# Patient Record
Sex: MALE | Race: WHITE
[De-identification: names, ages, dates, MRNs, and addresses within clinical notes are randomized per-mention and may not be internally consistent; named-entity substitution may affect disease eponyms.]

---

## 2024-02-08 ENCOUNTER — HOSPITAL ENCOUNTER (OUTPATIENT)
Dept: HOSPITAL 99 - MRI 3T | Age: 49
End: 2024-02-08
Payer: COMMERCIAL

## 2024-02-08 DIAGNOSIS — M51.26: ICD-10-CM

## 2024-02-08 DIAGNOSIS — M54.16: Primary | ICD-10-CM

## 2024-08-15 ENCOUNTER — OFFICE VISIT (OUTPATIENT)
Dept: GASTROENTEROLOGY | Facility: CLINIC | Age: 49
End: 2024-08-15
Payer: COMMERCIAL

## 2024-08-15 ENCOUNTER — TELEPHONE (OUTPATIENT)
Dept: GASTROENTEROLOGY | Facility: CLINIC | Age: 49
End: 2024-08-15

## 2024-08-15 ENCOUNTER — TELEPHONE (OUTPATIENT)
Age: 49
End: 2024-08-15

## 2024-08-15 VITALS
HEIGHT: 71 IN | BODY MASS INDEX: 23.38 KG/M2 | DIASTOLIC BLOOD PRESSURE: 90 MMHG | WEIGHT: 167 LBS | SYSTOLIC BLOOD PRESSURE: 120 MMHG

## 2024-08-15 DIAGNOSIS — K59.09 OTHER CONSTIPATION: ICD-10-CM

## 2024-08-15 DIAGNOSIS — R19.8 CHANGE IN BOWEL FUNCTION: Primary | ICD-10-CM

## 2024-08-15 DIAGNOSIS — R63.4 WEIGHT LOSS, ABNORMAL: ICD-10-CM

## 2024-08-15 PROCEDURE — 99204 OFFICE O/P NEW MOD 45 MIN: CPT | Performed by: STUDENT IN AN ORGANIZED HEALTH CARE EDUCATION/TRAINING PROGRAM

## 2024-08-15 RX ORDER — CALCIUM CARBONATE 500 MG/1
1 TABLET, CHEWABLE ORAL DAILY
COMMUNITY

## 2024-08-15 RX ORDER — SITAGLIPTIN AND METFORMIN HYDROCHLORIDE 50; 500 MG/1; MG/1
TABLET, FILM COATED, EXTENDED RELEASE ORAL
COMMUNITY

## 2024-08-15 RX ORDER — OMEPRAZOLE 10 MG/1
CAPSULE, DELAYED RELEASE ORAL
COMMUNITY

## 2024-08-15 RX ORDER — HYDROCODONE BITARTRATE AND ACETAMINOPHEN 5; 325 MG/1; MG/1
TABLET ORAL
COMMUNITY

## 2024-08-15 RX ORDER — LIDOCAINE 50 MG/G
1 PATCH TOPICAL DAILY
COMMUNITY
Start: 2024-04-02

## 2024-08-15 RX ORDER — GABAPENTIN 100 MG/1
1 CAPSULE ORAL 3 TIMES DAILY
COMMUNITY
Start: 2024-04-02

## 2024-08-15 RX ORDER — VALACYCLOVIR HYDROCHLORIDE 1 G/1
TABLET, FILM COATED ORAL
COMMUNITY

## 2024-08-15 RX ORDER — SILDENAFIL 100 MG/1
TABLET, FILM COATED ORAL
COMMUNITY

## 2024-08-15 RX ORDER — IBUPROFEN 400 MG/1
TABLET, FILM COATED ORAL
COMMUNITY

## 2024-08-15 RX ORDER — GABAPENTIN 300 MG/1
CAPSULE ORAL
COMMUNITY
Start: 2024-08-09

## 2024-08-15 RX ORDER — POLYETHYLENE GLYCOL 3350, SODIUM CHLORIDE, SODIUM BICARBONATE, POTASSIUM CHLORIDE 420; 11.2; 5.72; 1.48 G/4L; G/4L; G/4L; G/4L
4000 POWDER, FOR SOLUTION ORAL ONCE
Qty: 4000 ML | Refills: 0 | Status: SHIPPED | OUTPATIENT
Start: 2024-08-15 | End: 2024-08-15

## 2024-08-15 RX ORDER — PREDNISONE 10 MG/1
TABLET ORAL
COMMUNITY
Start: 2024-03-07

## 2024-08-15 NOTE — PROGRESS NOTES
Consultation - [facility]     Billy Zee 48 y.o. male MRN: [id number]  Unit/Bed#:  Encounter: [id number]    Consults    ASSESSMENT and PLAN    1. Change in bowel function  Significant constipation with bloating, abdominal discomfort, respiratory discomfort and 'foul gas'. Not responding to over the counter medications. Plan to start Linzess 290 mcg for minimum of 8 weeks. Plan for EGD/colonoscopy to rule out any structural/mucosal disease, minimum 4 weeks from now to allow Linzess to work and increase likelihood of successful bowel prep. Golytely sent. Procedure, preparation, risk and benefits discussed in detail.  - linaCLOtide 290 MCG CAPS; Take 1 capsule by mouth in the morning  Dispense: 30 capsule; Refill: 1  - EGD; Future  - Colonoscopy; Future  - polyethylene glycol-electrolytes (NULYTELY) 4000 mL solution; Take 4,000 mL by mouth once for 1 dose  Dispense: 4000 mL; Refill: 0    2. Weight loss, abnormal  I suspect his weight loss is due to his constipation which is leading to his significant abdominal complaints. Will begin Linzess as previously discussed and get EGD/colonoscopy.  - EGD; Future  - Colonoscopy; Future  - polyethylene glycol-electrolytes (NULYTELY) 4000 mL solution; Take 4,000 mL by mouth once for 1 dose  Dispense: 4000 mL; Refill: 0    3. Other constipation  Linzess as above  - linaCLOtide 290 MCG CAPS; Take 1 capsule by mouth in the morning  Dispense: 30 capsule; Refill: 1      Chief Complaint   Patient presents with    Constipation       HPI  Billy Zee is a 48 y.o. year old male with a past medical history of chronic altered bowel habits, chronic intermittent RUQ discomfort who presents for evaluation of significant abdominal discomfort, constipation, bloating and changes in stool caliber.    Beginning in February of last year he developed significant GI issues including vomiting, RUQ 'sticking out'. He also complained of vomiting and went to the ED for 'food  "poisoning'.     He is complaining of constipation \"like I've never had before\" with significant abdominal bloating that is leading to respiratory discomfort. He also reports that he is dropping weight and not eating well. He has taken dulcolax, MiraLAX and fiber without any relief. He is concerned about a blockage somewhere.    He also states that when he does have solid stool, it can be thin. He has never had an upper endoscopy, colonoscopy or CT to his knowledge. He did have hernia surgery in the past.      GI History:  Prior Colonoscopy: No prior colonoscopy  Prior EGD:  No prior EGD  Family hx:  No reported first degree relatives with colorectal cancer  Surgical hx: hernia repair  Blood thinners: Denies antiplatelet or anticoagulation use  NSAID use: Denies regular NSAID use  DM meds: None  Social Hx: No regular ETOH, smoking, or drug use.          Historical Information   No past medical history on file.  No past surgical history on file.  Social History   Social History     Substance and Sexual Activity   Alcohol Use Not on file     Social History     Substance and Sexual Activity   Drug Use Not on file     Social History     Tobacco Use   Smoking Status Not on file   Smokeless Tobacco Not on file     No family history on file.    Meds/Allergies     No current facility-administered medications for this visit.     Not in a hospital admission.    No Known Allergies    PHYSICAL EXAM    Visit Vitals  /90 (BP Location: Right arm, Patient Position: Sitting)   Ht 5' 11\" (1.803 m)   Wt 75.8 kg (167 lb)   BMI 23.29 kg/m²   BSA 1.95 m²     Body mass index is 23.29 kg/m².  General Appearance: NAD, cooperative, alert  Eyes: Anicteric, EOMI  ENT: Normocephalic, atraumatic, normal mucosa  Neck: symmetrical, trachea midline  Lungs: clear to auscultation, non-labored respirations on room air, no wheezing  CV: S1 S2+ radial pulses bilaterally  GI:  Soft, non-tender, non-distended; normal bowel sounds; no masses, no " "organomegaly   Rectal: Deferred  Musculoskeletal: No gross deformities or pitting edema  Skin:  No jaundice, no rashes  Neurologic: awake, alert, oriented, no gross deficits    No results found for: \"GLUCOSE\", \"CALCIUM\", \"NA\", \"K\", \"CO2\", \"CL\", \"BUN\", \"CREATININE\"  No results found for: \"WBC\", \"HGB\", \"MCV\", \"PLT\"  No results found for: \"ALT\", \"AST\", \"GGT\", \"ALKPHOS\", \"TBILI\"  No results found for: \"AMYLASE\"  No results found for: \"LIPASE\"  No results found for: \"IRON\", \"TIBC\", \"FERRITIN\"  No results found for: \"INR\"    No results found.    Imaging Studies: I have personally reviewed pertinent reports.      Pathology, and Other Studies: I have personally reviewed pertinent reports.      REVIEW OF SYSTEMS    CONSTITUTIONAL: negative unless stated in HPI  GASTROINTESTINAL: As noted in the HPI        "

## 2024-08-15 NOTE — TELEPHONE ENCOUNTER
PA for Linzess 290 SUBMITTED     via    [][facility]-KEY:   [x]Martha-Case ID #   [id number]    Payer: [employer]   Phone: [phone]     []Faxed to plan   []Other website   []Phone call Case ID #     Office notes sent, clinical questions answered. Awaiting determination    Turnaround time for your insurance to make a decision on your Prior Authorization can take 7-21 business days.

## 2024-08-15 NOTE — TELEPHONE ENCOUNTER
Scheduled date of combo (as of today):09/27/24  Physician performing combo:Dr Grajeda  Location of combo: [facility]  Bowel prep reviewed with patient: golytely  Instructions reviewed with patient by:geo  Clearances: none

## 2024-08-15 NOTE — TELEPHONE ENCOUNTER
PA for Linzess 290 Denied    Reason:(Screenshot if applicable)  Denied    Note from payer: pwe were not able to establish medical necessity based on the information submitted by your doctor. The request does not meet our rules for GI Motility, Chronic Agents. To approve this drug, the following rules need to be met:/pul          liThe dose is approved for your condition /li     liYour doctor must tell us there are no known medical reasons why it may be unsafe for you to take this drug, such as a known or suspected bowel blockage/li /ul pIf you think you meet the rules listed above, please ask your doctor to send us this information./p  Payer: [employer] Case ID: [id number]  Electronic appeal: Not supported  Appeal instructions: You have the right to appeal. If you want to appeal, you must request your appeal within 60 calendar days after the date of this notice. If you have any questions please contact [employer] at [phone] or fax your appeal to [phone].  Appeal deadline: August 29, 2024 (In 2 weeks)  View History  Medication Being Authorized    linaCLOtide 290 MCG CAPS  Take 1 capsule by mouth in the morning  Dispense: 30 capsule Refills: 1   Start: 8/15/2024 End: 10/14/2024   Class: Normal Diagnoses: Change in bowel function, Other constipation   This order has been released to its destination.  To be filled at: [facility]/pharmacy #[id number] - [place] - [address]      Message sent to office clinical pool Yes    Denial letter NOT scanned into Media No letter, message from sure scirpts above    Appeal started No ( Provider will need to decide if appeal is warranted and send clinical documentation to Prior Authorization Team for initiation.)    **Please follow up with your patient regarding denial and next steps**

## 2024-08-16 DIAGNOSIS — R19.8 CHANGE IN BOWEL FUNCTION: ICD-10-CM

## 2024-08-16 DIAGNOSIS — K59.09 OTHER CONSTIPATION: Primary | ICD-10-CM

## 2024-08-16 RX ORDER — LUBIPROSTONE 24 UG/1
24 CAPSULE ORAL 2 TIMES DAILY WITH MEALS
Qty: 180 CAPSULE | Refills: 0 | Status: SHIPPED | OUTPATIENT
Start: 2024-08-16 | End: 2024-11-14

## 2024-08-16 NOTE — TELEPHONE ENCOUNTER
PA for lubiprostone 24 SUBMITTED     via    []M-KEY:  [x]Martha-Case ID #   [id number]    Payer: [employer]   Phone: [phone]     []Faxed to plan   []Other website  []Phone call Case ID #     Office notes sent, clinical questions answered. Awaiting determination    Turnaround time for your insurance to make a decision on your Prior Authorization can take 7-21 business days.

## 2024-08-17 NOTE — TELEPHONE ENCOUNTER
PA for lubiprostone Approved     Date(s) approved August 11, 2024 to August 16, 2025     Case #[id number]     Patient advised by          [] [employer] Message  [] Phone call   [x]LMOM  []L/M to call office as no active Communication consent on file  []Unable to leave detailed message as VM not approved on Communication consent       Pharmacy advised by    [x]Fax  []Phone call    Approval letter scanned into Media Yes

## 2024-09-13 ENCOUNTER — ANESTHESIA EVENT (OUTPATIENT)
Dept: ANESTHESIOLOGY | Facility: AMBULATORY SURGERY CENTER | Age: 49
End: 2024-09-13

## 2024-09-13 ENCOUNTER — ANESTHESIA (OUTPATIENT)
Dept: ANESTHESIOLOGY | Facility: AMBULATORY SURGERY CENTER | Age: 49
End: 2024-09-13

## 2024-09-13 ENCOUNTER — TELEPHONE (OUTPATIENT)
Dept: GASTROENTEROLOGY | Facility: CLINIC | Age: 49
End: 2024-09-13

## 2024-09-13 NOTE — TELEPHONE ENCOUNTER
Procedure confirmed  Colonoscopy and EGD    Via: Voice mail    Instructions given: Given to Patient at Visit     Prep Given: Golytely    Call the office if there are any questions.

## 2024-10-01 ENCOUNTER — TELEPHONE (OUTPATIENT)
Age: 49
End: 2024-10-01

## 2024-10-01 ENCOUNTER — OFFICE VISIT (OUTPATIENT)
Dept: GASTROENTEROLOGY | Facility: CLINIC | Age: 49
End: 2024-10-01
Payer: COMMERCIAL

## 2024-10-01 VITALS
BODY MASS INDEX: 22.96 KG/M2 | DIASTOLIC BLOOD PRESSURE: 80 MMHG | SYSTOLIC BLOOD PRESSURE: 118 MMHG | HEIGHT: 71 IN | WEIGHT: 164 LBS

## 2024-10-01 DIAGNOSIS — K21.9 GASTROESOPHAGEAL REFLUX DISEASE, UNSPECIFIED WHETHER ESOPHAGITIS PRESENT: ICD-10-CM

## 2024-10-01 DIAGNOSIS — R10.9 CHRONIC ABDOMINAL PAIN: ICD-10-CM

## 2024-10-01 DIAGNOSIS — G89.29 CHRONIC ABDOMINAL PAIN: ICD-10-CM

## 2024-10-01 DIAGNOSIS — K59.09 CHRONIC CONSTIPATION: Primary | ICD-10-CM

## 2024-10-01 PROCEDURE — 99214 OFFICE O/P EST MOD 30 MIN: CPT | Performed by: STUDENT IN AN ORGANIZED HEALTH CARE EDUCATION/TRAINING PROGRAM

## 2024-10-01 NOTE — TELEPHONE ENCOUNTER
PA for Linzess 290 mcg SUBMITTED     via    []CMM-KEY:   [x]Martha-Case ID #   [id number]    Payer: [employer]   Phone: [phone]     []Availity-Auth ID # NDC #   []Faxed to plan   []Other website   []Phone call Case ID #     Office notes sent, clinical questions answered. Awaiting determination    Turnaround time for your insurance to make a decision on your Prior Authorization can take 7-21 business days.

## 2024-10-01 NOTE — PROGRESS NOTES
[facility] - Outpatient Follow-up Note  Billy Zee 48 y.o. male MRN: [id number]  Encounter: [id number]    ASSESSMENT AND PLAN:           1. Chronic constipation  Not relieved with Amitiza BID. Only moving bowels every 5 days and in small amounts with this. We will submit for Linzess 290 mcg daily as an alternative. Since he is still able to move his bowels, low suspicion for blockage. Can take his bowel prep to try and flush his system and then do Linzess to keep things moving. Will get a CT scan to rule out any large masses or other abnormalities. If unremarkable, will work to get his EGD/colonoscopy scheduled.  - CT abdomen pelvis w contrast; Future  - linaCLOtide 290 MCG CAPS; Take 1 capsule by mouth in the morning  Dispense: 90 capsule; Refill: 0    2. Chronic abdominal pain  Suspect secondary to constipation. CT as above.  - CT abdomen pelvis w contrast; Future    3. Gastroesophageal reflux disease, unspecified whether esophagitis present  Chronic, worse with constipation symptoms. Had been on omeprazole previously with improvement. Sent 20mg daily. EGD when able. Will get CT first.  - omeprazole (PriLOSEC) 20 mg delayed release capsule; Take 1 capsule (20 mg total) by mouth daily  Dispense: 90 capsule; Refill: 0         Follow up appointment: 3 months    _______________________      Chief Complaint   Patient presents with    Follow-up     Pt feels Amizia is not working, bloated, constipation, goes 1x every 8 days, watery stool, pain in entire abdomen, has some gas and reflux, only eats one meal a day       HPI:   Billy is a pleasant 48 year old male presenting for follow up of his chronic constipation, abdominal pain and acid reflux. He was started on Amitiza BID, but did not feel it helped much. He was moving his bowels every 5 days with 'watery material followed by small amount of stool'. He continues to have abodminal pain/bloating and reflux symptoms with this. Due to  "these concerns, he cancelled his endoscopy and colonoscopy. No report of rectal bleeding. He is eating, though appetite has diminished and he is having some weight loss.      Historical Information   Past Medical History:   Diagnosis Date    GERD (gastroesophageal reflux disease)      Past Surgical History:   Procedure Laterality Date    COLONOSCOPY      HERNIA REPAIR       Social History     Substance and Sexual Activity   Alcohol Use Never     Social History     Substance and Sexual Activity   Drug Use Not on file     Social History     Tobacco Use   Smoking Status Every Day    Types: Cigarettes   Smokeless Tobacco Never     Family History   Problem Relation Age of Onset    Colon cancer Neg Hx     Colon polyps Neg Hx          Current Outpatient Medications:     calcium carbonate (TUMS) 500 mg chewable tablet    gabapentin (NEURONTIN) 300 mg capsule    linaCLOtide 290 MCG CAPS    omeprazole (PriLOSEC) 20 mg delayed release capsule    sildenafil (VIAGRA) 100 mg tablet    valACYclovir (VALTREX) 1,000 mg tablet    gabapentin (NEURONTIN) 100 mg capsule    HYDROcodone-acetaminophen (NORCO) 5-325 mg per tablet    ibuprofen (MOTRIN) 400 mg tablet    lidocaine (LIDODERM) 5 %    polyethylene glycol-electrolytes (NULYTELY) 4000 mL solution    predniSONE 10 mg tablet    SITagliptin-metFORMIN HCl ER (Janumet XR)  MG TB24  No Known Allergies  Reviewed medications and allergies and updated as indicated    PHYSICAL EXAM:    Blood pressure 118/80, height 5' 11\" (1.803 m), weight 74.4 kg (164 lb). Body mass index is 22.87 kg/m².  General Appearance: NAD, cooperative, alert  Eyes: Anicteric  GI:  Soft, non-tender, non-distended; normal bowel sounds; no masses, no organomegaly   Rectal: Deferred  Musculoskeletal: No edema.  Skin:  No jaundice    Lab Results:   No results found for: \"WBC\", \"HGB\", \"MCV\", \"PLT\"  No results found for: \"NA\", \"K\", \"CL\", \"CO2\", \"ANIONGAP\", \"BUN\", \"CREATININE\", \"GLUCOSE\", \"GLUF\", \"CALCIUM\", " "\"CORRECTEDCA\", \"AST\", \"ALT\", \"ALKPHOS\", \"PROT\", \"BILITOT\", \"EGFR\"  No results found for: \"IRON\", \"TIBC\", \"FERRITIN\"  No results found for: \"LIPASE\"    Radiology Results:   No results found.  "

## 2024-10-02 NOTE — TELEPHONE ENCOUNTER
PA for Linzess 290 mcg  APPROVED     Date(s) approved Authorized from October 1, 2024 to October 1, 2025    Prior authorization approved  Payer: Kiki Case ID: [id number]  Note from payer: Approved. LINZESS 290MCG Capsule is approved from 10/01/2024 to 10/01/2025. All strengths of the drug are approved.  Approval Details    Authorized from October 1, 2024 to October 1, 2025    Case #[id number]    Patient advised by          [][employer] Message  []Phone call   [x]LMOM  []L/M to call office as no active Communication consent on file  []Unable to leave detailed message as VM not approved on Communication consent       Pharmacy advised by    [x]Fax  []Phone call    Approval letter NOT scanned into Media No letter at time of approval

## 2024-10-03 ENCOUNTER — TELEPHONE (OUTPATIENT)
Dept: GASTROENTEROLOGY | Facility: CLINIC | Age: 49
End: 2024-10-03

## 2024-10-03 NOTE — TELEPHONE ENCOUNTER
Billy called in.   He has a CT abdomen pelvis with contrast scheduled at [facility] on Oct 11th.   He stated he needs a BUN and Creatinine drawn prior. He uses [facility].   He also stated [facility] told him that he needs an RX for the barium. I attempted to called [facility] central scheduling at [phone] to verify what is needed, but they were closed for the day.   I will request Dr Grajeda to put in order to [facility] for BUN/creatinine, and I will attempt to reach [facility] for clarification tomorrow.  In separate T.E. I sent prior auth team too

## 2024-10-03 NOTE — TELEPHONE ENCOUNTER
Pt called to speak to Dr. Grajeda stated he has visited  yesterday ordered CT abdomen pelvis w/contrast transferred to Peg in the office

## 2024-10-03 NOTE — TELEPHONE ENCOUNTER
Order Name/Test Name:   CT ABDOMEN PELVIS W CONTRAST [[id number]]        Date of Service: 10-11-24    Location/Facility name/Address/Phone number:[address] [address] [phone]    Location/Facility NPI:[facility] NPI: [id number]

## 2024-10-04 DIAGNOSIS — G89.29 CHRONIC ABDOMINAL PAIN: Primary | ICD-10-CM

## 2024-10-04 DIAGNOSIS — R10.9 CHRONIC ABDOMINAL PAIN: Primary | ICD-10-CM

## 2024-10-04 NOTE — TELEPHONE ENCOUNTER
I spoke with [facility] central scheduling at [phone]. They let me know that patients are to  the contrast barium to drank prior to CT scans at the Hospital  M-F 8am-7pm and/or   Sat 8-11:30 am  Patients are to bring in their CT scan order to show them in order to receive the Barium.     (It is also available at the [facility] and [facility] also, but I did not get those hours)     I also faxed to order to them at [phone]    I left Billy TREJO with this info and that Dr Grajeda put the lab orders in to [facility]

## 2024-10-11 ENCOUNTER — TELEPHONE (OUTPATIENT)
Dept: GASTROENTEROLOGY | Facility: CLINIC | Age: 49
End: 2024-10-11

## 2024-10-11 NOTE — TELEPHONE ENCOUNTER
Left patient a message to let him know that we are attempting to do a peer to peer to cover his CAT scan that was ordered by Dr. Grajeda with his last office visit.  I was placed on hold for 32 minutes this afternoon awaiting a peer to peer to come on the line.  Will make an attempt possibly before the end of today and again during office hours on Monday however wanted the patient to know that we did make an attempt to get the peer to peer completed today.

## 2024-10-15 ENCOUNTER — TELEPHONE (OUTPATIENT)
Dept: GASTROENTEROLOGY | Facility: CLINIC | Age: 49
End: 2024-10-15

## 2024-10-15 DIAGNOSIS — G89.29 CHRONIC ABDOMINAL PAIN: Primary | ICD-10-CM

## 2024-10-15 DIAGNOSIS — R10.9 CHRONIC ABDOMINAL PAIN: Primary | ICD-10-CM

## 2024-10-15 DIAGNOSIS — K59.09 CHRONIC CONSTIPATION: ICD-10-CM

## 2024-10-15 NOTE — TELEPHONE ENCOUNTER
"I spoke with Billy and alerted him of the denial. He also received a denial letter stating it was denied due to \"belly pain\" not being a good enough reason. I let him know on our end it was submitted with the codes for Chronic constipation and Chronic abdominal pain, and that more/different testing was being requested. I let him know we would reach out after Dr Grajeda reviewed the information.       Type Date User Summary Attachment   Prior Auth Follow Up 10/15/2024  9:06 AM Eliz Francisco MA Reply from office re p2p -- will advise office to update pt -   Note:  Reply from office re p2p -- will advise office to update pt        .  Type Date User Summary Attachment   Prior Auth Follow Up 10/14/2024  9:11 AM Eliz Francisco MA Sent message to clinical team to follow on up p2p/img request -   Note:  Sent message to clinical team to follow on up p2p/img request   .  Type Date User Summary Attachment   Message 10/11/2024  4:22 PM CHRISTIANA Mccarthy FW: p2p -   Note:  ----- Message -----  From: Sivan Garza MA  Sent: 10/9/2024   8:32 AM EDT  To: *  Subject: FW: p2p                                              Please order the additional testing if appropriate. If not, please do a P2P. Thanks!  ----- Message -----  From: Eliz Francisco MA  Sent: 10/8/2024   2:40 PM EDT  To: [facility]  Subject: p2p                                               The insurance is requesting additional clinical; ultrasound, labs, xrays/scopes, without these it will need p2p        The prior authorization request for this study has not been approved. You can schedule a peer to peer by calling [facility] [phone] tracking # [id number] with the deadline date of 10/15. The insurance determined the following:     [x ] Does not meet Medical Necessity  [ x] No prior imaging  [ ] PT or conservative treatment incomplete  [x ] Missing Labs  [ ] Frequency     If you choose not to complete a peer to peer then please " reply to this message to let us know. Please notify your patient and contact central scheduling by calling [phone] to cancel the appointment and cancel the order in Epic.  .  Type Date User Summary Attachment   Prior Auth Follow Up 10/08/2024  2:35 PM Eliz Francisco MA Insurance is asking for additonal imaging -   Note:  Insurance is asking for additonal imaging      . To approve, we need results of other tests that were done first (such as blood and urine). We also need results of initial imaging (like sound wave (ultrasound), x-ray with or without dye (barium) or scope tests). Results of those tests should show why this test is still needed. It is our medical view that the Abdomen and Pelvis CT be denied as it is not medically necessary. [employer] Clinical Guideline 068 for Abdomen Pelvis CT was used in this request.     Will send for p2p  .  Type Date User Summary Attachment   Prior Auth Submitted 10/07/2024 11:10 AM Eliz Francisco MA DIAGNOSTIC AUTHORIZATION REVIEW -   Note:  DIAGNOSTIC AUTHORIZATION REVIEW      RTE Run & Reviewed: YES, writer ran & reviewed RTE     CPT/ HCPCS Code(s): 46448  Location: EXTERNAL LOCATION - [address] npi [phone]     [place] - REMINDER: CANNOT BE SELF PAY - [place] [employer]  Authorization Required: Yes, [id number] [employer] auth pending via julia at sent last 2 office notes, xray pelvis, tracking # [id number]

## 2024-12-25 DIAGNOSIS — K21.9 GASTROESOPHAGEAL REFLUX DISEASE, UNSPECIFIED WHETHER ESOPHAGITIS PRESENT: ICD-10-CM

## 2025-01-27 ENCOUNTER — HOSPITAL ENCOUNTER (EMERGENCY)
Dept: HOSPITAL 99 - EMR | Age: 50
LOS: 1 days | Discharge: HOME | End: 2025-01-28
Payer: COMMERCIAL

## 2025-01-27 VITALS — RESPIRATION RATE: 20 BRPM | DIASTOLIC BLOOD PRESSURE: 101 MMHG | SYSTOLIC BLOOD PRESSURE: 141 MMHG

## 2025-01-27 VITALS — BODY MASS INDEX: 24.5 KG/M2

## 2025-01-27 DIAGNOSIS — E11.9: ICD-10-CM

## 2025-01-27 DIAGNOSIS — Z87.891: ICD-10-CM

## 2025-01-27 DIAGNOSIS — K21.9: ICD-10-CM

## 2025-01-27 DIAGNOSIS — R55: Primary | ICD-10-CM

## 2025-01-27 PROCEDURE — 99284 EMERGENCY DEPT VISIT MOD MDM: CPT

## 2025-01-28 VITALS — DIASTOLIC BLOOD PRESSURE: 102 MMHG | RESPIRATION RATE: 17 BRPM | SYSTOLIC BLOOD PRESSURE: 144 MMHG

## 2025-01-28 VITALS — DIASTOLIC BLOOD PRESSURE: 99 MMHG | RESPIRATION RATE: 17 BRPM | SYSTOLIC BLOOD PRESSURE: 144 MMHG

## 2025-01-28 VITALS — RESPIRATION RATE: 17 BRPM

## 2025-01-28 VITALS — RESPIRATION RATE: 15 BRPM

## 2025-01-28 VITALS — RESPIRATION RATE: 16 BRPM

## 2025-03-09 ENCOUNTER — HOSPITAL ENCOUNTER (OUTPATIENT)
Dept: HOSPITAL 99 - PAVMRI | Age: 50
End: 2025-03-09
Payer: COMMERCIAL

## 2025-03-09 DIAGNOSIS — M54.16: Primary | ICD-10-CM
